# Patient Record
(demographics unavailable — no encounter records)

---

## 2025-05-09 NOTE — ADDENDUM
[FreeTextEntry1] : Newly elevated alkaline phosphatase level with an elevated GGT She does have coexisting hyperlipidemia which may be contributing to the elevated liver enzymes I would like for patient to obtain an abdominal ultrasound to further evaluate this finding and schedule a follow-up visit with me in 2 weeks.  At that time we can review her imaging results and we will likely also repeat some blood work on her to follow-up on this lab abnormality In the meantime dietary modifications with reduced consumption of red meat and dairy is suggested

## 2025-05-09 NOTE — HEALTH RISK ASSESSMENT
[Excellent] : ~his/her~  mood as  excellent [2 - 3 times a week (3 pts)] : 2 - 3  times a week (3 points) [1 or 2 (0 pts)] : 1 or 2 (0 points) [Never (0 pts)] : Never (0 points) [No] : In the past 12 months have you used drugs other than those required for medical reasons? No [No falls in past year] : Patient reported no falls in the past year [0] : 2) Feeling down, depressed, or hopeless: Not at all (0) [PHQ-2 Negative - No further assessment needed] : PHQ-2 Negative - No further assessment needed [Never] : Never [Patient reported mammogram was normal] : Patient reported mammogram was normal [Patient reported PAP Smear was normal] : Patient reported PAP Smear was normal [Patient reported bone density results were abnormal] : Patient reported bone density results were abnormal [Patient reported colonoscopy was normal] : Patient reported colonoscopy was normal [HIV test declined] : HIV test declined [Hepatitis C test declined] : Hepatitis C test declined [None] : None [With Family] : lives with family [Retired] : retired [] :  [# Of Children ___] : has [unfilled] children [Fully functional (bathing, dressing, toileting, transferring, walking, feeding)] : Fully functional (bathing, dressing, toileting, transferring, walking, feeding) [Fully functional (using the telephone, shopping, preparing meals, housekeeping, doing laundry, using] : Fully functional and needs no help or supervision to perform IADLs (using the telephone, shopping, preparing meals, housekeeping, doing laundry, using transportation, managing medications and managing finances) [Reviewed no changes] : Reviewed, no changes [Yes] : Reviewed medication list for presence of high-risk medications. [Benzodiazepines] : benzodiazepines [Opioids] : opioids [Audit-CScore] : 3 [[id number]] : 0 [Change in mental status noted] : No change in mental status noted [Language] : denies difficulty with language [Behavior] : denies difficulty with behavior [Learning/Retaining New Information] : denies difficulty learning/retaining new information [Handling Complex Tasks] : denies difficulty handling complex tasks [Reasoning] : denies difficulty with reasoning [Spatial Ability and Orientation] : denies difficulty with spatial ability and orientation [Reports changes in hearing] : Reports no changes in hearing [Reports changes in vision] : Reports no changes in vision [MammogramDate] : 02/25 [MammogramComments] : per patient [PapSmearDate] : 02/25 [PapSmearComments] : per patient [BoneDensityDate] : 01/25 [ColonoscopyDate] : 02/24 [AdvancecareDate] : 05/25

## 2025-05-09 NOTE — HISTORY OF PRESENT ILLNESS
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 Occasional EtOH use.   SURESH is a 65 year F whom is here today for an annual well check  Specialists Involved: -GI: Dr. Cho  -Gyn: Dr. Flores ([phone]) -Derm: Dr. Cole ([phone])  -Vaccines: Prevnar 20 -DEXA: 1/2025 -Mammogram: 1/2025 -Pap Smear: 1/2025 -Colonoscopy: 2/2024. Normal. Repeat 5yr 2/2 increased Colon CA risk due to genetic mutation -FH of Colon, breast or ovarian CA: None known  -Osteopenia noted on DEXA 1/2025 with lowest T-score -1.2 femoral neck.  Normal FRAX

## 2025-05-09 NOTE — HEALTH RISK ASSESSMENT
[Excellent] : ~his/her~  mood as  excellent [2 - 3 times a week (3 pts)] : 2 - 3  times a week (3 points) [1 or 2 (0 pts)] : 1 or 2 (0 points) [Never (0 pts)] : Never (0 points) [No] : In the past 12 months have you used drugs other than those required for medical reasons? No [No falls in past year] : Patient reported no falls in the past year [0] : 2) Feeling down, depressed, or hopeless: Not at all (0) [PHQ-2 Negative - No further assessment needed] : PHQ-2 Negative - No further assessment needed [Never] : Never [Patient reported mammogram was normal] : Patient reported mammogram was normal [Patient reported PAP Smear was normal] : Patient reported PAP Smear was normal [Patient reported bone density results were abnormal] : Patient reported bone density results were abnormal [Patient reported colonoscopy was normal] : Patient reported colonoscopy was normal [HIV test declined] : HIV test declined [Hepatitis C test declined] : Hepatitis C test declined [None] : None [With Family] : lives with family [Retired] : retired [] :  [# Of Children ___] : has [unfilled] children [Fully functional (bathing, dressing, toileting, transferring, walking, feeding)] : Fully functional (bathing, dressing, toileting, transferring, walking, feeding) [Fully functional (using the telephone, shopping, preparing meals, housekeeping, doing laundry, using] : Fully functional and needs no help or supervision to perform IADLs (using the telephone, shopping, preparing meals, housekeeping, doing laundry, using transportation, managing medications and managing finances) [Reviewed no changes] : Reviewed, no changes [Yes] : Reviewed medication list for presence of high-risk medications. [Benzodiazepines] : benzodiazepines [Opioids] : opioids [Audit-CScore] : 3 [[id number]] : 0 [Change in mental status noted] : No change in mental status noted [Language] : denies difficulty with language [Behavior] : denies difficulty with behavior [Learning/Retaining New Information] : denies difficulty learning/retaining new information [Handling Complex Tasks] : denies difficulty handling complex tasks [Reasoning] : denies difficulty with reasoning [Spatial Ability and Orientation] : denies difficulty with spatial ability and orientation [Reports changes in hearing] : Reports no changes in hearing [Reports changes in vision] : Reports no changes in vision [MammogramDate] : 02/25 [MammogramComments] : per patient [PapSmearDate] : 02/25 [PapSmearComments] : per patient [BoneDensityDate] : 01/25 [ColonoscopyDate] : 02/24 [AdvancecareDate] : 05/25

## 2025-05-09 NOTE — HISTORY OF PRESENT ILLNESS
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 Occasional EtOH use.   SURESH is a 65 year F whom is here today for an annual well check  Specialists Involved: -GI: Dr. Cho  -Gyn: Dr. Flores ([phone]) -Derm: Dr. Cole ([phone])  -Vaccines: Prevnar 20 -DEXA: 1/2025 -Mammogram: 1/2025 -Pap Smear: 1/2025 -Colonoscopy: 2/2024. Normal. Repeat 5yr 2/2 increased Colon CA risk due to genetic mutation -FH of Colon, breast or ovarian CA: None known  -Osteopenia noted on DEXA 1/2025 with lowest T-score -1.2 femoral neck.  Normal FRAX

## 2025-05-27 NOTE — ASSESSMENT
[FreeTextEntry1] : Specialists Involved: -Gyn: Dr. Flores ([phone]) -Derm: Dr. Cole ([phone])  -F/u labs drawn in office today -Further recs pending lab results -Continue annual f/u w/ Gyn  -Cardio consult pending -RTO yearly for CPE or sooner if needed.

## 2025-05-27 NOTE — HISTORY OF PRESENT ILLNESS
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 Occasional EtOH use.   SURESH is a 65 year F whom is here today for Follow-up hyperlipidemia, elevated ALP & GGT Total cholesterol 222,  , GGT 76 5/12/2025 normal right upper quadrant abdominal ultrasound  -Osteopenia noted on DEXA 1/2025 with lowest T-score -1.2 femoral neck.  Normal FRAX

## 2025-06-13 NOTE — PHYSICAL EXAM
[Well Developed] : well developed [Well Nourished] : well nourished [No Acute Distress] : no acute distress [Normal Conjunctiva] : normal conjunctiva [Normal Venous Pressure] : normal venous pressure [No Carotid Bruit] : no carotid bruit [Normal S1, S2] : normal S1, S2 [No Rub] : no rub [No Gallop] : no gallop [Clear Lung Fields] : clear lung fields [Good Air Entry] : good air entry [No Respiratory Distress] : no respiratory distress  [Soft] : abdomen soft [Non Tender] : non-tender [No Masses/organomegaly] : no masses/organomegaly [Normal Bowel Sounds] : normal bowel sounds [Normal Gait] : normal gait [No Edema] : no edema [No Cyanosis] : no cyanosis [No Clubbing] : no clubbing [No Varicosities] : no varicosities [No Rash] : no rash [No Skin Lesions] : no skin lesions [Moves all extremities] : moves all extremities [No Focal Deficits] : no focal deficits [Normal Speech] : normal speech [Alert and Oriented] : alert and oriented [Normal memory] : normal memory [de-identified] : 1/6 [facility]

## 2025-06-13 NOTE — DISCUSSION/SUMMARY
[Patient] : the patient [With Me] : with me [___ Year(s)] : in [unfilled] year(s) [EKG obtained to assist in diagnosis and management of assessed problem(s)] : EKG obtained to assist in diagnosis and management of assessed problem(s)

## 2025-06-13 NOTE — CARDIOLOGY SUMMARY
[de-identified] : EKG 6/13/2025 Sinus Bradycardia  HR 51 bpm WITHIN NORMAL LIMITS   EKG through PCP office 5/6/2025 SR, WNL

## 2025-06-13 NOTE — HISTORY OF PRESENT ILLNESS
[FreeTextEntry1] : HEART MURMUR HLD   Echo 11/23/2021 LVEF 63. RV nl. Sclerotic AoV with normal opening.  HLD: Not on meds yet. Father had CABG at age 60s-70s.   ROS Denied dizziness, dyspnea, orthopnea, pnd, edema, angina, arrhythmia, bleeding, syncope, near syncope   FUNCTIONAL CAPACITY Est >4.0 METS  CARDIAC MEDS Denied  CHRONIC, STABLE CONDITIONS HLD  CARDIAC TESTING